# Patient Record
Sex: MALE | Race: WHITE | Employment: FULL TIME | URBAN - METROPOLITAN AREA
[De-identification: names, ages, dates, MRNs, and addresses within clinical notes are randomized per-mention and may not be internally consistent; named-entity substitution may affect disease eponyms.]

---

## 2017-01-12 ENCOUNTER — OFFICE VISIT (OUTPATIENT)
Dept: OPTOMETRY | Facility: CLINIC | Age: 47
End: 2017-01-12
Payer: COMMERCIAL

## 2017-01-12 DIAGNOSIS — H52.4 MYOPIA OF BOTH EYES WITH ASTIGMATISM AND PRESBYOPIA: Primary | ICD-10-CM

## 2017-01-12 DIAGNOSIS — H52.203 MYOPIA OF BOTH EYES WITH ASTIGMATISM AND PRESBYOPIA: Primary | ICD-10-CM

## 2017-01-12 DIAGNOSIS — H52.13 MYOPIA OF BOTH EYES WITH ASTIGMATISM AND PRESBYOPIA: Primary | ICD-10-CM

## 2017-01-12 PROCEDURE — 99207 ZZC NO BILLABLE SERVICE THIS VISIT: CPT | Performed by: OPTOMETRIST

## 2017-01-12 ASSESSMENT — REFRACTION_MANIFEST
OS_AXIS: 170
OS_ADD: +1.25
OD_CYLINDER: +0.75
OD_AXIS: 010
OD_SPHERE: -1.25
OS_SPHERE: -1.00
OD_ADD: +1.25
OS_CYLINDER: +0.75

## 2017-01-12 ASSESSMENT — VISUAL ACUITY
OD_SC: 20/30 -1
OD_CC: 20/20
OS_CC: 20/20
OS_SC: 20/40
CORRECTION_TYPE: GLASSES
METHOD: SNELLEN - LINEAR

## 2017-01-12 NOTE — PROGRESS NOTES
RX CHECK      No charge visit  Problems with glasses mainly the left lens is not working very well. Patient can only wear the glasses for 45 mins to 1 hour. Patient has had the glasses for 2 weeks.    Glasses read : OD -1.00 0.50 x013  OS -1.00 0.50 x160, 2nd pair -1.00 0.75 x 177  OS -1.00 0.75 x153,  Sunglasses -1.25 0.75 x002  OS 0-1.25 0.75 x154.    Temi Luque    Optometry Tech       Objective: See Ophthalmology exam    Assessment:    ICD-10-CM    1. Myopia of both eyes with astigmatism and presbyopia H52.13     H52.203     H52.4        Plan:  Rechecked the prescription and got a slight change for the left eye.  Take the glasses back to where you had them made and have them remake the left lens  Return to clinic as needed.      Malaika Loco O.D.  [address] [place]  [address]    [phone]

## 2017-01-12 NOTE — MR AVS SNAPSHOT
After Visit Summary   1/12/2017    Ilya Chopra    MRN: [id number]           Patient Information     Date Of Birth          1970        Visit Information        Provider Department      1/12/2017 12:00 PM Malaika Loco OD [facility]        Today's Diagnoses     Myopia of both eyes with astigmatism and presbyopia    -  1       Care Instructions    Rechecked the prescription and got a slight change for the left eye.  Take the glasses back to where you had them made and have them remake the left lens  Return to clinic as needed.      Malaika Loco O.D.  [facility]  [address] [place]  [address]    [phone]          Follow-ups after your visit        Follow-up notes from your care team     Return in about 1 year (around 1/12/2018) for Eye Exam.      Who to contact     If you have questions or need follow up information about today's clinic visit or your schedule please contact [facility] directly at [phone].  Normal or non-critical lab and imaging results will be communicated to you by [email or web address], letter or phone within 4 business days after the clinic has received the results. If you do not hear from us within 7 days, please contact the clinic through [email or web address] or phone. If you have a critical or abnormal lab result, we will notify you by phone as soon as possible.  Submit refill requests through upurskill or call your pharmacy and they will forward the refill request to us. Please allow 3 business days for your refill to be completed.          Additional Information About Your Visit        MyChart Information     [facility]urskill gives you secure access to your electronic health record. If you see a primary care provider, you can also send messages to your care team and make appointments. If you have questions, please call your primary care clinic.  If you do not have a primary care provider, please call [phone] and they will assist  you.        Care EveryWhere ID     This is your Care EveryWhere ID. This could be used by other organizations to access your [facility] medical records  [id number]         Blood Pressure from Last 3 Encounters:   03/01/16 110/70   10/12/15 112/72   03/31/15 100/74    Weight from Last 3 Encounters:   03/01/16 93.441 kg (206 lb)   10/12/15 93.895 kg (207 lb)   03/31/15 92.987 kg (205 lb)              Today, you had the following     No orders found for display       Primary Care Provider Office Phone # Fax #    Concepcion Santacruz [phone]       [facility] [address]  [facility] [address]        Thank you!     Thank you for choosing [facility]  for your care. Our goal is always to provide you with excellent care. Hearing back from our patients is one way we can continue to improve our services. Please take a few minutes to complete the written survey that you may receive in the mail after your visit with us. Thank you!             Your Updated Medication List - Protect others around you: Learn how to safely use, store and throw away your medicines at www.disposemymeds.org.          This list is accurate as of: 1/12/17 12:42 PM.  Always use your most recent med list.                   Brand Name Dispense Instructions for use    B-12 1000 MCG Tbcr     100 tablet    Take 3,000 mcg by mouth daily       cholecalciferol 1000 UNIT tablet    vitamin D    100 tablet    Take 1 tablet (1,000 Units) by mouth daily       citalopram 20 MG tablet    celeXA    90 tablet    Take 1 tablet (20 mg) by mouth daily       cyclobenzaprine 10 MG tablet    FLEXERIL    30 tablet    Take 1 tablet (10 mg) by mouth 3 times daily as needed for muscle spasms       EPINEPHrine 0.3 MG/0.3ML injection    EPIPEN 2-HAYDER    2 each    Inject 0.3 mLs into the muscle once as needed for anaphylaxis for 1 dose.       fluticasone 50 MCG/ACT spray    FLONASE    1 Package    Spray 2 sprays into both nostrils daily as needed        gemfibrozil 600 MG tablet    LOPID    180 tablet    Take 1 tablet (600 mg) by mouth 2 times daily       ibuprofen 200 MG tablet    ADVIL/MOTRIN     Take 600 mg by mouth every 8 hours as needed.       omeprazole 20 MG tablet     90 tablet    Take 1 tablet (20 mg) by mouth daily Take 30-60 minutes before a meal.       prochlorperazine 10 MG tablet    COMPAZINE    20 tablet    Take 1 tablet (10 mg) by mouth every 6 hours as needed for nausea or agitation       * promethazine 25 MG tablet    PHENERGAN    20 tablet    Take 1 tablet by mouth every 6 hours as needed for nausea.       * promethazine 25 MG Suppository    PHENERGAN    10 suppository    Place 1 suppository (25 mg) rectally every 6 hours as needed for nausea       * SUMAtriptan 50 MG tablet    IMITREX    18 tablet    Take 1 tablet (50 mg) by mouth at onset of headache for migraine May repeat dose in 2 hours.  Do not exceed 200 mg in 24 hours       * SUMAtriptan 100 MG tablet    IMITREX    18 tablet    Take 1 tablet (100 mg) by mouth at onset of headache for migraine May repeat in 2 hours if needed       * Notice:  This list has 4 medication(s) that are the same as other medications prescribed for you. Read the directions carefully, and ask your doctor or other care provider to review them with you.

## 2017-01-12 NOTE — PATIENT INSTRUCTIONS
Rechecked the prescription and got a slight change for the left eye.  Take the glasses back to where you had them made and have them remake the left lens  Return to clinic as needed.      Malaika Loco O.D.  [address]  [address]    [phone]

## 2017-05-17 ENCOUNTER — OFFICE VISIT (OUTPATIENT)
Dept: FAMILY MEDICINE | Facility: CLINIC | Age: 47
End: 2017-05-17
Payer: COMMERCIAL

## 2017-05-17 VITALS
TEMPERATURE: 98 F | DIASTOLIC BLOOD PRESSURE: 88 MMHG | OXYGEN SATURATION: 98 % | BODY MASS INDEX: 28.28 KG/M2 | WEIGHT: 202 LBS | RESPIRATION RATE: 18 BRPM | HEART RATE: 81 BPM | SYSTOLIC BLOOD PRESSURE: 122 MMHG | HEIGHT: 71 IN

## 2017-05-17 DIAGNOSIS — M25.50 MULTIPLE JOINT PAIN: ICD-10-CM

## 2017-05-17 DIAGNOSIS — Z00.00 ROUTINE GENERAL MEDICAL EXAMINATION AT A HEALTH CARE FACILITY: Primary | ICD-10-CM

## 2017-05-17 DIAGNOSIS — E78.1 HYPERTRIGLYCERIDEMIA: ICD-10-CM

## 2017-05-17 DIAGNOSIS — R11.0 NAUSEA: ICD-10-CM

## 2017-05-17 DIAGNOSIS — M43.16 SPONDYLOLISTHESIS OF LUMBAR REGION: ICD-10-CM

## 2017-05-17 DIAGNOSIS — B07.8 OTHER VIRAL WARTS: ICD-10-CM

## 2017-05-17 PROCEDURE — 17110 DESTRUCTION B9 LES UP TO 14: CPT | Performed by: FAMILY MEDICINE

## 2017-05-17 PROCEDURE — 99396 PREV VISIT EST AGE 40-64: CPT | Mod: 25 | Performed by: FAMILY MEDICINE

## 2017-05-17 RX ORDER — GEMFIBROZIL 600 MG/1
600 TABLET, FILM COATED ORAL 2 TIMES DAILY
Qty: 180 TABLET | Refills: 0 | Status: SHIPPED | OUTPATIENT
Start: 2017-05-17

## 2017-05-17 RX ORDER — CYCLOBENZAPRINE HCL 10 MG
10 TABLET ORAL 3 TIMES DAILY PRN
Qty: 30 TABLET | Refills: 1 | Status: SHIPPED | OUTPATIENT
Start: 2017-05-17

## 2017-05-17 RX ORDER — PROMETHAZINE HYDROCHLORIDE 25 MG/1
25 TABLET ORAL EVERY 6 HOURS PRN
Qty: 20 TABLET | Refills: 1 | Status: SHIPPED | OUTPATIENT
Start: 2017-05-17

## 2017-05-17 NOTE — PROGRESS NOTES
SUBJECTIVE:     CC: Ilya Chopra is an 46 year old male who presents for preventative health visit.     Physical   Annual:     Getting at least 3 servings of Calcium per day::  Yes    Bi-annual eye exam::  Yes    Dental care twice a year::  Yes    Sleep apnea or symptoms of sleep apnea::  Daytime drowsiness and Excessive snoring    Diet::  Vegetarian/vegan    Frequency of exercise::  2-3 days/week    Duration of exercise::  30-45 minutes    Taking medications regularly::  No    Barriers to taking medications::  Problems remembering to take them    Additional concerns today::  YES (joint ache all over body )    CV:pt with h/o hypertriglyceridemia, fatty liver disease, and h/o migraine.  Malignancy: prostate cancer in MGF and PFG.  No colon cancer in the family; he has already had a colonoscopy.    Bone health: no risk factors  Immunizations: tdap done 2009  Sexual health: no concerns  Depression screen: neg    Other:  Migraines; he needs a refill of phenergan.  Taking this along with benadryl and lying down in a dark room are the most helpful.  He describes that his aura is the smell of GI bleed.  He is a nurse.  He has noted that oxygen also helps.     Right index finger wart: has tried some over the counter freezing treatments and medicated pads, filing it down, but nothing has helped.  He requests cryotherapy, which he has had before on other warts.     Joint pains: elbows, wrists and ankles with achy pain.  He states that he is actually moving to [place] where his joint pains are better.  He has morning stiffness.  He requests labs to evaluate for inflammatory arthritis.  He has had neg testing in the past.       Today's PHQ-2 Score:   PHQ-2 ( 1999 [employer]) 5/17/2017   Q1: Little interest or pleasure in doing things -   Q2: Feeling down, depressed or hopeless -   PHQ-2 Score -   Little interest or pleasure in doing things Not at all   Feeling down, depressed or hopeless Not at all   PHQ-2 Score 0        Abuse: Current or Past(Physical, Sexual or Emotional)- No  Do you feel safe in your environment - Yes    Social History   Substance Use Topics     Smoking status: Never Smoker     Smokeless tobacco: Never Used     Alcohol use No     The patient does not drink >3 drinks per day nor >7 drinks per week.    Last PSA: No results found for: PSA    Recent Labs   Lab Test  03/01/16   1203  10/12/15   1204  09/03/13   1226   CHOL  160  200*  176   HDL  39*  39*  38*   LDL  97  86  108   TRIG  121  376*  152*   CHOLHDLRATIO   --   5.1*  4.7   NHDL  121   --    --        Reviewed orders with patient. Reviewed health maintenance and updated orders accordingly - Yes    Reviewed and updated as needed this visit by clinical staff         Reviewed and updated as needed this visit by Provider            ROS:  C: NEGATIVE for fever, chills, change in weight  I: NEGATIVE for worrisome rashes, moles or lesions  E: NEGATIVE for vision changes or irritation  ENT: NEGATIVE for ear, mouth and throat problems  R: NEGATIVE for significant cough or SOB  CV: NEGATIVE for chest pain, palpitations or peripheral edema  GI: NEGATIVE for nausea, abdominal pain, heartburn, or change in bowel habits   male: negative for dysuria, hematuria, decreased urinary stream, erectile dysfunction, urethral discharge  M: NEGATIVE for significant arthralgias or myalgia  N: NEGATIVE for weakness, dizziness or paresthesias  P: NEGATIVE for changes in mood or affect    Patient Active Problem List   Diagnosis     LOW BACK PAIN WITH POSSIBLE OLD SPONDYLOLYSIS     Allergic rhinitis     Hypertriglyceridemia     Fatty liver     Hyperlipidemia LDL goal <130     Hip impingement syndrome     Elevated serum creatinine     Seasonal allergic rhinitis     GERD (gastroesophageal reflux disease)     Migraine with aura     Family history of colonic polyps     Overweight (BMI 25.0-29.9)     Migraine with aura and without status migrainosus, not intractable      Spondylisthesis     Past Surgical History:   Procedure Laterality Date     ARTHROSCOPY HIP, OSTEOPLASTY FEMUR PROXIMAL, COMBINED  9/7/2011    Procedure:COMBINED ARTHROSCOPY HIP, OSTEOPLASTY FEMUR PROXIMAL; Right Hip Arthroscopy, Labral Repair, CAM correction; Surgeon:JOHN SCHMIDT; [facility]     ARTHROSCOPY HIP, OSTEOPLASTY FEMUR PROXIMAL, COMBINED  9/17/2012    Procedure: COMBINED ARTHROSCOPY HIP, OSTEOPLASTY FEMUR PROXIMAL;  Left Hip Arthroscopy,Cam Correction,    Labral Repair ;  Surgeon: John Schmidt MD;  [facility]: [facility]     COLONOSCOPY  9/17/2013    Procedure: COLONOSCOPY;;  Surgeon: Zafar Campbell MD;  Location: [facility]     ESOPHAGOSCOPY, GASTROSCOPY, DUODENOSCOPY (EGD), COMBINED  9/17/2013    Procedure: COMBINED ESOPHAGOSCOPY, GASTROSCOPY, DUODENOSCOPY (EGD), BIOPSY SINGLE OR MULTIPLE;  Colonoscopy and EGD, Family history of colonic polyps, Epigastric pain  pkt sent 9/5/13  pkt sent  rx faxed [facility]  [phone]  am;  Surgeon: Zafar Campbell MD;  Location: [facility]     ORTHOPEDIC SURGERY       right ankle repair       SURGICAL HISTORY OF -   2002    right ankle, dleayed primary repair, lateral ankle ligaments       Social History   Substance Use Topics     Smoking status: Never Smoker     Smokeless tobacco: Never Used     Alcohol use No     Family History   Problem Relation Age of Onset     Asthma Mother      DIABETES Father      Hypertension Father      Cardiovascular Father      MI     Lipids Father      C.A.D. Father      CEREBROVASCULAR DISEASE Maternal Grandmother      Prostate Cancer Maternal Grandfather      Prostate Cancer Paternal Grandfather      CANCER Paternal Grandfather      leukemia     Breast Cancer No family hx of      Cancer - colorectal No family hx of      Glaucoma No family hx of      Macular Degeneration No family hx of          Current Outpatient Prescriptions   Medication Sig Dispense Refill     cyclobenzaprine (FLEXERIL) 10 MG tablet Take 1 tablet (10 mg) by  "mouth 3 times daily as needed for muscle spasms 30 tablet 1     promethazine (PHENERGAN) 25 MG tablet Take 1 tablet (25 mg) by mouth every 6 hours as needed for nausea 20 tablet 1     gemfibrozil (LOPID) 600 MG tablet Take 1 tablet (600 mg) by mouth 2 times daily 180 tablet 0     Cyanocobalamin (B-12) 1000 MCG TBCR Take 3,000 mcg by mouth daily 100 tablet 1     cholecalciferol (VITAMIN D) 1000 UNIT tablet Take 1 tablet (1,000 Units) by mouth daily 100 tablet 3     fluticasone (FLONASE) 50 MCG/ACT nasal spray Spray 2 sprays into both nostrils daily as needed 1 Package 0     prochlorperazine (COMPAZINE) 10 MG tablet Take 1 tablet (10 mg) by mouth every 6 hours as needed for nausea or agitation 20 tablet 1     omeprazole 20 MG tablet Take 1 tablet (20 mg) by mouth daily Take 30-60 minutes before a meal. 90 tablet 3     ibuprofen (ADVIL,MOTRIN) 200 MG tablet Take 600 mg by mouth every 8 hours as needed.       EPINEPHrine (EPIPEN 2-HAYDER) 0.3 MG/0.3ML injection Inject 0.3 mLs into the muscle once as needed for anaphylaxis for 1 dose. (Patient not taking: Reported on 5/17/2017) 2 each 3     Allergies   Allergen Reactions     Shellfish Allergy      Seasonal Allergies      OBJECTIVE:     /88 (BP Location: Right arm, Patient Position: Chair, Cuff Size: Adult Regular)  Pulse 81  Temp 98  F (36.7  C) (Oral)  Resp 18  Ht 5' 11.25\" (1.81 m)  Wt 202 lb (91.6 kg)  SpO2 98%  BMI 27.98 kg/m2  EXAM:  GENERAL: healthy, alert and no distress  EYES: Eyes grossly normal to inspection, PERRL and conjunctivae and sclerae normal  HENT: ear canals and TM's normal, nose and mouth without ulcers or lesions  NECK: no adenopathy, no asymmetry, masses, or scars and thyroid normal to palpation  RESP: lungs clear to auscultation - no rales, rhonchi or wheezes  CV: regular rate and rhythm, normal S1 S2, no S3 or S4, no murmur, click or rub, no peripheral edema and peripheral pulses strong  ABDOMEN: soft, nontender, no hepatosplenomegaly, " no masses and bowel sounds normal  MS: no gross musculoskeletal defects noted, no edema; no joint deformities, erythema or swelling  SKIN: no suspicious lesions or rashes; verruciform lesion to pad of right index finger  NEURO: Normal strength and tone, mentation intact and speech normal  PSYCH: mentation appears normal, affect normal/bright    ASSESSMENT/PLAN:     1. Routine general medical examination at a health care facility    - GLUCOSE; Future  - Lipid Profile; Future    2. Spondylolisthesis of lumbar region    - cyclobenzaprine (FLEXERIL) 10 MG tablet; Take 1 tablet (10 mg) by mouth 3 times daily as needed for muscle spasms  Dispense: 30 tablet; Refill: 1    3. Hypertriglyceridemia    - gemfibrozil (LOPID) 600 MG tablet; Take 1 tablet (600 mg) by mouth 2 times daily  Dispense: 180 tablet; Refill: 0  - Lipid Profile; Future    4. Nausea    - promethazine (PHENERGAN) 25 MG tablet; Take 1 tablet (25 mg) by mouth every 6 hours as needed for nausea  Dispense: 20 tablet; Refill: 1    5. Multiple joint pain    - Cyclic Citrullinated Peptide Antibody IgG; Future  - Rheumatoid factor; Future  - ESR: Erythrocyte sedimentation rate; Future  - TSH; Future  - T4, free; Future  - CK total; Future    6. Other viral warts  The wart on his right index finger was debrided and treated x3 with cryotherapy, tolerated well, no adverse effects  - DESTRUCT BENIGN LESION, UP TO 14    COUNSELING:   Reviewed preventive health counseling, as reflected in patient instructions       Regular exercise       Healthy diet/nutrition       Colon cancer screening       Prostate cancer screening         reports that he has never smoked. He has never used smokeless tobacco.    Estimated body mass index is 27.94 kg/(m^2) as calculated from the following:    Height as of 3/31/15: 6' (1.829 m).    Weight as of 3/1/16: 206 lb (93.4 kg).       Counseling Resources:  ATP IV Guidelines  Pooled Cohorts Equation Calculator  FRAX Risk Assessment  ICSI  Preventive Guidelines  Dietary Guidelines for Americans, 2010  USDA's MyPlate  ASA Prophylaxis  Lung CA Screening    Naa Winters MD  [facility]  Answers for HPI/ROS submitted by the patient on 5/17/2017   Q1: Little interest or pleasure in doing things: 0=Not at all  Q2: Feeling down, depressed or hopeless: 0=Not at all  PHQ-2 Score: 0

## 2017-05-17 NOTE — NURSING NOTE
"Chief Complaint   Patient presents with     Physical       Initial /89 (BP Location: Left arm, Patient Position: Chair, Cuff Size: Adult Regular)  Pulse 81  Temp 98  F (36.7  C) (Oral)  Resp 18  Ht 5' 11.25\" (1.81 m)  Wt 202 lb (91.6 kg)  SpO2 98%  BMI 27.98 kg/m2 Estimated body mass index is 27.98 kg/(m^2) as calculated from the following:    Height as of this encounter: 5' 11.25\" (1.81 m).    Weight as of this encounter: 202 lb (91.6 kg).  Medication Reconciliation: complete       Melisa Gould MA      "

## 2017-05-17 NOTE — Clinical Note
The encounter isn't closed yet, so i know you can't look at it quite yet, but how do i do a no charge/procedure if I froze a wart at a preventive visit?

## 2017-05-17 NOTE — NURSING NOTE
"Vitals:    05/17/17 1309 05/17/17 1340 05/17/17 1341   BP: 142/89 140/90 122/88   BP Location: Left arm Left arm Right arm   Patient Position: Chair Chair Chair   Cuff Size: Adult Regular Adult Regular Adult Regular   Pulse: 81     Resp: 18     Temp: 98  F (36.7  C)     TempSrc: Oral     SpO2: 98%     Weight: 202 lb (91.6 kg)     Height: 5' 11.25\" (1.81 m)           Melisa Gould MA        "

## 2017-05-17 NOTE — MR AVS SNAPSHOT
After Visit Summary   5/17/2017    Ilya Chopra    MRN: [id number]           Patient Information     Date Of Birth          1970        Visit Information        Provider Department      5/17/2017 1:00 PM Naa Winters MD [facility]        Today's Diagnoses     Routine general medical examination at a health care facility    -  1    Spondylolisthesis of lumbar region        Hypertriglyceridemia        Nausea        Multiple joint pain        Other viral warts          Care Instructions      Preventive Health Recommendations  Male Ages 40 to 49    Yearly exam:             See your health care provider every year in order to  o   Review health changes.   o   Discuss preventive care.    o   Review your medicines if your doctor has prescribed any.    You should be tested each year for STDs (sexually transmitted diseases) if you re at risk.     Have a cholesterol test every 5 years.     Have a colonoscopy (test for colon cancer) if someone in your family has had colon cancer or polyps before age 50.     After age 45, have a diabetes test (fasting glucose). If you are at risk for diabetes, you should have this test every 3 years.      Talk with your health care provider about whether or not a prostate cancer screening test (PSA) is right for you.    Shots: Get a flu shot each year. Get a tetanus shot every 10 years.     Nutrition:    Eat at least 5 servings of fruits and vegetables daily.     Eat whole-grain bread, whole-wheat pasta and brown rice instead of white grains and rice.     Talk to your provider about Calcium and Vitamin D.     Lifestyle    Exercise for at least 150 minutes a week (30 minutes a day, 5 days a week). This will help you control your weight and prevent disease.     Limit alcohol to one drink per day.     No smoking.     Wear sunscreen to prevent skin cancer.     See your dentist every six months for an exam and cleaning.            Follow-ups after  your visit        Future tests that were ordered for you today     Open Future Orders        Priority Expected Expires Ordered    ESR: Erythrocyte sedimentation rate Routine  7/17/2017 5/17/2017    TSH Routine  7/17/2017 5/17/2017    T4, free Routine  7/17/2017 5/17/2017    CK total Routine  7/17/2017 5/17/2017    GLUCOSE Routine  7/17/2017 5/17/2017    Lipid Profile Routine  7/17/2017 5/17/2017    Cyclic Citrullinated Peptide Antibody IgG Routine  7/17/2017 5/17/2017    Rheumatoid factor Routine  7/17/2017 5/17/2017            Who to contact     If you have questions or need follow up information about today's clinic visit or your schedule please contact [facility] directly at [phone].  Normal or non-critical lab and imaging results will be communicated to you by MyChart, letter or phone within 4 business days after the clinic has received the results. If you do not hear from us within 7 days, please contact the clinic through MyChart or phone. If you have a critical or abnormal lab result, we will notify you by phone as soon as possible.  Submit refill requests through [employer] or call your pharmacy and they will forward the refill request to us. Please allow 3 business days for your refill to be completed.          Additional Information About Your Visit        [employer] Information     [employer] gives you secure access to your electronic health record. If you see a primary care provider, you can also send messages to your care team and make appointments. If you have questions, please call your primary care clinic.  If you do not have a primary care provider, please call [phone] and they will assist you.        Care EveryWhere ID     This is your Care EveryWhere ID. This could be used by other organizations to access your [facility] medical records  [id number]        Your Vitals Were     Pulse Temperature Respirations Height Pulse Oximetry BMI (Body Mass Index)    81 98  F (36.7  C)  "(Oral) 18 5' 11.25\" (1.81 m) 98% 27.98 kg/m2       Blood Pressure from Last 3 Encounters:   05/17/17 122/88   03/01/16 110/70   10/12/15 112/72    Weight from Last 3 Encounters:   05/17/17 202 lb (91.6 kg)   03/01/16 206 lb (93.4 kg)   10/12/15 207 lb (93.9 kg)              We Performed the Following     DESTRUCT BENIGN LESION, UP TO 14          Today's Medication Changes          These changes are accurate as of: 5/17/17 11:59 PM.  If you have any questions, ask your nurse or doctor.               These medicines have changed or have updated prescriptions.        Dose/Directions    promethazine 25 MG tablet   Commonly known as:  PHENERGAN   This may have changed:  Another medication with the same name was removed. Continue taking this medication, and follow the directions you see here.   Used for:  Nausea   Changed by:  Naa Winters MD        Dose:  25 mg   Take 1 tablet (25 mg) by mouth every 6 hours as needed for nausea   Quantity:  20 tablet   Refills:  1         Stop taking these medicines if you haven't already. Please contact your care team if you have questions.     citalopram 20 MG tablet   Commonly known as:  celeXA   Stopped by:  Naa Winters MD           SUMAtriptan 100 MG tablet   Commonly known as:  IMITREX   Stopped by:  Naa Winters MD           SUMAtriptan 50 MG tablet   Commonly known as:  IMITREX   Stopped by:  Naa Winters MD                Where to get your medicines      These medications were sent to [facility] [place], [place] - [address]  [address] [address]     Phone:  [phone]     cyclobenzaprine 10 MG tablet    gemfibrozil 600 MG tablet    promethazine 25 MG tablet                Primary Care Provider Office Phone # Fax #    Concepcion Santacruz [phone]       [facility] [address]  [facility] [address]        Thank you!     Thank you for choosing [facility]  for " your care. Our goal is always to provide you with excellent care. Hearing back from our patients is one way we can continue to improve our services. Please take a few minutes to complete the written survey that you may receive in the mail after your visit with us. Thank you!             Your Updated Medication List - Protect others around you: Learn how to safely use, store and throw away your medicines at www.disposemymeds.org.          This list is accurate as of: 5/17/17 11:59 PM.  Always use your most recent med list.                   Brand Name Dispense Instructions for use    B-12 1000 MCG Tbcr     100 tablet    Take 3,000 mcg by mouth daily       cholecalciferol 1000 UNIT tablet    vitamin D    100 tablet    Take 1 tablet (1,000 Units) by mouth daily       cyclobenzaprine 10 MG tablet    FLEXERIL    30 tablet    Take 1 tablet (10 mg) by mouth 3 times daily as needed for muscle spasms       EPINEPHrine 0.3 MG/0.3ML injection    EPIPEN 2-HAYDER    2 each    Inject 0.3 mLs into the muscle once as needed for anaphylaxis for 1 dose.       fluticasone 50 MCG/ACT spray    FLONASE    1 Package    Spray 2 sprays into both nostrils daily as needed       gemfibrozil 600 MG tablet    LOPID    180 tablet    Take 1 tablet (600 mg) by mouth 2 times daily       ibuprofen 200 MG tablet    ADVIL/MOTRIN     Take 600 mg by mouth every 8 hours as needed.       omeprazole 20 MG tablet     90 tablet    Take 1 tablet (20 mg) by mouth daily Take 30-60 minutes before a meal.       prochlorperazine 10 MG tablet    COMPAZINE    20 tablet    Take 1 tablet (10 mg) by mouth every 6 hours as needed for nausea or agitation       promethazine 25 MG tablet    PHENERGAN    20 tablet    Take 1 tablet (25 mg) by mouth every 6 hours as needed for nausea

## 2017-05-27 DIAGNOSIS — Z00.00 ROUTINE GENERAL MEDICAL EXAMINATION AT A HEALTH CARE FACILITY: ICD-10-CM

## 2017-05-27 DIAGNOSIS — M25.50 MULTIPLE JOINT PAIN: ICD-10-CM

## 2017-05-27 DIAGNOSIS — E78.1 HYPERTRIGLYCERIDEMIA: ICD-10-CM

## 2017-05-27 LAB
CHOLEST SERPL-MCNC: 223 MG/DL
CK SERPL-CCNC: 221 U/L (ref 30–300)
ERYTHROCYTE [SEDIMENTATION RATE] IN BLOOD BY WESTERGREN METHOD: 5 MM/H (ref 0–15)
GLUCOSE SERPL-MCNC: 98 MG/DL (ref 70–99)
HDLC SERPL-MCNC: 47 MG/DL
LDLC SERPL CALC-MCNC: 115 MG/DL
NONHDLC SERPL-MCNC: 176 MG/DL
T4 FREE SERPL-MCNC: 0.87 NG/DL (ref 0.76–1.46)
TRIGL SERPL-MCNC: 305 MG/DL
TSH SERPL DL<=0.05 MIU/L-ACNC: 1.82 MU/L (ref 0.4–4)

## 2017-05-27 PROCEDURE — 86431 RHEUMATOID FACTOR QUANT: CPT | Performed by: FAMILY MEDICINE

## 2017-05-27 PROCEDURE — 85652 RBC SED RATE AUTOMATED: CPT | Performed by: FAMILY MEDICINE

## 2017-05-27 PROCEDURE — 84443 ASSAY THYROID STIM HORMONE: CPT | Performed by: FAMILY MEDICINE

## 2017-05-27 PROCEDURE — 82947 ASSAY GLUCOSE BLOOD QUANT: CPT | Performed by: FAMILY MEDICINE

## 2017-05-27 PROCEDURE — 80061 LIPID PANEL: CPT | Performed by: FAMILY MEDICINE

## 2017-05-27 PROCEDURE — 84439 ASSAY OF FREE THYROXINE: CPT | Performed by: FAMILY MEDICINE

## 2017-05-27 PROCEDURE — 86200 CCP ANTIBODY: CPT | Performed by: FAMILY MEDICINE

## 2017-05-27 PROCEDURE — 82550 ASSAY OF CK (CPK): CPT | Performed by: FAMILY MEDICINE

## 2017-05-27 PROCEDURE — 36415 COLL VENOUS BLD VENIPUNCTURE: CPT | Performed by: FAMILY MEDICINE

## 2017-05-30 LAB — RHEUMATOID FACT SER NEPH-ACNC: <20 IU/ML (ref 0–20)

## 2017-05-31 LAB — CCP AB SER IA-ACNC: 1 U/ML

## 2019-11-08 ENCOUNTER — HEALTH MAINTENANCE LETTER (OUTPATIENT)
Age: 49
End: 2019-11-08

## 2020-12-06 ENCOUNTER — HEALTH MAINTENANCE LETTER (OUTPATIENT)
Age: 50
End: 2020-12-06

## 2021-09-25 ENCOUNTER — HEALTH MAINTENANCE LETTER (OUTPATIENT)
Age: 51
End: 2021-09-25

## 2022-01-15 ENCOUNTER — HEALTH MAINTENANCE LETTER (OUTPATIENT)
Age: 52
End: 2022-01-15

## 2023-01-07 ENCOUNTER — HEALTH MAINTENANCE LETTER (OUTPATIENT)
Age: 53
End: 2023-01-07

## 2023-04-22 ENCOUNTER — HEALTH MAINTENANCE LETTER (OUTPATIENT)
Age: 53
End: 2023-04-22